# Patient Record
Sex: MALE | Race: WHITE | NOT HISPANIC OR LATINO | Employment: OTHER | URBAN - METROPOLITAN AREA
[De-identification: names, ages, dates, MRNs, and addresses within clinical notes are randomized per-mention and may not be internally consistent; named-entity substitution may affect disease eponyms.]

---

## 2021-07-01 ENCOUNTER — TRANSFERRED RECORDS (OUTPATIENT)
Dept: HEALTH INFORMATION MANAGEMENT | Facility: CLINIC | Age: 71
End: 2021-07-01

## 2021-07-01 ENCOUNTER — MEDICAL CORRESPONDENCE (OUTPATIENT)
Dept: HEALTH INFORMATION MANAGEMENT | Facility: CLINIC | Age: 71
End: 2021-07-01

## 2021-07-01 ENCOUNTER — TRANSCRIBE ORDERS (OUTPATIENT)
Dept: OTHER | Age: 71
End: 2021-07-01

## 2021-07-01 DIAGNOSIS — R47.1 DYSARTHRIA: ICD-10-CM

## 2021-07-01 DIAGNOSIS — R13.10 DYSPHAGIA, UNSPECIFIED: ICD-10-CM

## 2021-07-01 DIAGNOSIS — R25.3 TONGUE FASCICULATION: Primary | ICD-10-CM

## 2021-07-07 ENCOUNTER — OFFICE VISIT (OUTPATIENT)
Dept: NEUROLOGY | Facility: CLINIC | Age: 71
End: 2021-07-07
Payer: MEDICARE

## 2021-07-07 DIAGNOSIS — G12.20 MND (MOTOR NEURONE DISEASE) (H): Primary | ICD-10-CM

## 2021-07-07 PROCEDURE — 95886 MUSC TEST DONE W/N TEST COMP: CPT | Performed by: PSYCHIATRY & NEUROLOGY

## 2021-07-07 PROCEDURE — 95887 MUSC TST DONE W/N TST NONEXT: CPT | Performed by: PSYCHIATRY & NEUROLOGY

## 2021-07-07 PROCEDURE — 95911 NRV CNDJ TEST 9-10 STUDIES: CPT | Performed by: PSYCHIATRY & NEUROLOGY

## 2021-07-07 PROCEDURE — 95885 MUSC TST DONE W/NERV TST LIM: CPT | Mod: 59 | Performed by: PSYCHIATRY & NEUROLOGY

## 2021-07-07 NOTE — LETTER
2021       RE: Murtaza Jean  [address] [place] [address]     Dear Colleague,    Thank you for referring your patient, Murtaza Jean, to the [facility] EMG CLINIC [place] at [facility]. Please see a copy of my visit note below.        [facility]  Electrodiagnostic Laboratory    Nerve Conduction & EMG Report          Patient:       Murtaza Jean  Patient ID:    [id number]  Gender:        Male  YOB: 1950  Age:           70 Years 11 Months        History & Examination:  70 year old man with about 6 months of progressive slurred speech, He denies weakness in his limbs. Evaluate for motor neuron disorder.     Techniques: Motor and sensory conduction studies were done with surface recording electrodes. EMG was done with a concentric needle electrode.      Results:  Nerve conduction studies:  1. Left median-D2, ulnar-D5, radial, sural, and superficial peroneal sensory responses are normal.   2. Left ulnar-ADM motor response shows normal DL, normal amplitude and moderate CV slowing across the elbow.   3. Left median-APB, peroneal-EDB, and tibial-AH motor responses are normal.     Needle EM. Fibrillation potentials and positive sharp waves were seen in the left biceps, PT, FDI, EIP, thoracic paraspinal, TA, gastrocnemius, and tongue muscles.   2. Fasciculation potentials were seen in the left deltoid, biceps, triceps, thoracic paraspinal, TA, VL, gastrocnemius, and tongue muscles.   3. Large amplitude and/or long duration motor unit potentials (MUP) were seen in essentially all of the sampled left upper and lower limb muscles as tabulated below.   4. Rapidly firing MUPs with reduced recruitment were seen in the left deltoid, biceps, triceps, PT, EIP, FDI and TA muscles.     Interpretation:  This is an abnormal study. 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 Clinical correlation is recommended.       Nathan Thomson MD  Department of Neurology        Sensory NCS      Nerve / Sites Rec. Site Onset Peak NP Amp Ref. PP Amp Dist Beau Ref. Temp     ms ms  V  V  V cm m/s m/s  C   L MEDIAN - Dig II Anti      Wrist Dig II 2.60 3.85 31.1 10.0 48.3 14 53.8 48.0 32.1   L ULNAR - Dig V Anti      Wrist Dig V 2.66 3.96 19.4 8.0 33.7 13 48.9 48.0 32.1   L RADIAL - Snuff      Forearm Snuff 2.08 2.76 22.9 15.0 39.7 12.5 60.0 48.0 32.1   L SURAL - Lat Mall 60      Calf Ankle 3.13 4.43 9.9 5.0 9.1 14 44.8 38.0 31   L SUP PERONEAL      Lat Leg Wiggins 3.18 4.48 4.0  4.6 12.5 39.3 38.0 31       Motor NCS      Nerve / Sites Rec. Site Lat Ref. Amp Ref. Rel Amp Dist Beau Ref. Dur. Area Temp.     ms ms mV mV % cm m/s m/s ms %  C   L MEDIAN - APB      Wrist APB 3.96 4.40 11.7 5.0 100 8   8.49 100 32.1      Elbow APB 9.58  10.7  91.9 27 48.0 48.0 8.28 95.9 32.1      Axilla APB 11.67  10.8  92.4 10 48.0  8.49 94.8 32.1   L ULNAR - ADM      Wrist ADM 3.39 3.50 8.4 5.0 100 8   6.56 100 32.8      B.Elbow ADM 8.13  7.6  90.4 26 54.9 48.0 6.82 89.1 32.7      A.Elbow ADM 10.52  7.4  88.2 10 41.7 48.0 6.72 86.3 32.7      Axilla ADM 11.98  7.1  83.7 8 54.9 48.0 6.67 83.5 32.7   L DEEP PERONEAL - EDB 60      Ankle EDB 5.10 6.00 3.0 2.0 100 8   5.00 100 31      FibHead EDB 13.07  2.5  82.5 33 41.4 38.0 6.72 98.6 31      Pop Fos EDB 15.36  2.6  84.6 10 43.6 38.0 7.66 91.1 31   L TIBIAL - AH      Ankle AH 4.48 6.00 6.4 4.0 100 8   5.16 100 31      Pop Fos AH 14.27  5.7  89.2 43 43.9 38.0 5.57 94.2 31       F  Wave      Nerve Min F Lat Max F Lat Mean FLat Temp.    ms ms ms  C   L MEDIAN 29.95 55.63 40.38 32.1   L ULNAR 33.65 39.01 36.76 32   L TIBIAL 50.00 62.34 59.18 31       EMG Summary Table     Spontaneous Recruitment MUAP    IA Fib PSW Fasc H.F. Pattern Amp Dur. PPP   L.  DELTOID N None None 3+ None ModReduced N 1+ 2+   L. BICEPS Inc 1+ 1+ 2+ None MildReduced N N 2+   L. TRICEPS N None None 2+ None MildReduced N 1+ 2+   L. PRON TERES Inc 2+ 2+ None None MildReduced 1+ N 2+   L. FIRST D INTEROSS Inc 3+ 3+ None None ModReduced 2+ N 2+   L. EXT INDICIS Inc 2+ 2+ None None ModReduced 2+ N 1+   L. THOR PSP (M) Inc 2+ 2+ 2+ None       L. VAST LATERALIS N None None 1+ None N N N 1+   L. TIB ANTERIOR Inc 1+ 1+ 2+ None ModReduced N 2+ 2+   L. GASTROCN (MED) Inc 2+ 2+ 1+ None N 1+ N N   L. TONGUE Inc 2+ 2+ 1+ None                                          Again, thank you for allowing me to participate in the care of your patient.      Sincerely,    Nathan Thomson MD

## 2021-07-07 NOTE — PROGRESS NOTES
[facility]  Electrodiagnostic Laboratory    Nerve Conduction & EMG Report          Patient:       Murtaza Jean  Patient ID:    [id number]  Gender:        Male  YOB: 1950  Age:           70 Years 11 Months        History & Examination:  70 year old man with about 6 months of progressive slurred speech, He denies weakness in his limbs. Evaluate for motor neuron disorder.     Techniques: Motor and sensory conduction studies were done with surface recording electrodes. EMG was done with a concentric needle electrode.      Results:  Nerve conduction studies:  1. Left median-D2, ulnar-D5, radial, sural, and superficial peroneal sensory responses are normal.   2. Left ulnar-ADM motor response shows normal DL, normal amplitude and moderate CV slowing across the elbow.   3. Left median-APB, peroneal-EDB, and tibial-AH motor responses are normal.     Needle EM. Fibrillation potentials and positive sharp waves were seen in the left biceps, PT, FDI, EIP, thoracic paraspinal, TA, gastrocnemius, and tongue muscles.   2. Fasciculation potentials were seen in the left deltoid, biceps, triceps, thoracic paraspinal, TA, VL, gastrocnemius, and tongue muscles.   3. Large amplitude and/or long duration motor unit potentials (MUP) were seen in essentially all of the sampled left upper and lower limb muscles as tabulated below.   4. Rapidly firing MUPs with reduced recruitment were seen in the left deltoid, biceps, triceps, PT, EIP, FDI and TA muscles.     Interpretation:  This is an abnormal study. There is electrophysiologic evidence of a generalized disorder of lower motor neurons affecting the bulbar, cervical, thoracic, and lumbosacral segments. 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 Clinical correlation is recommended.       Nathan Thomson MD  Department of Neurology        Sensory NCS      Nerve / Sites Rec. Site Onset Peak NP Amp Ref. PP Amp Dist  Beau Ref. Temp     ms ms  V  V  V cm m/s m/s  C   L MEDIAN - Dig II Anti      Wrist Dig II 2.60 3.85 31.1 10.0 48.3 14 53.8 48.0 32.1   L ULNAR - Dig V Anti      Wrist Dig V 2.66 3.96 19.4 8.0 33.7 13 48.9 48.0 32.1   L RADIAL - Snuff      Forearm Snuff 2.08 2.76 22.9 15.0 39.7 12.5 60.0 48.0 32.1   L SURAL - Lat Mall 60      Calf Ankle 3.13 4.43 9.9 5.0 9.1 14 44.8 38.0 31   L SUP PERONEAL      Lat Leg Wiggins 3.18 4.48 4.0  4.6 12.5 39.3 38.0 31       Motor NCS      Nerve / Sites Rec. Site Lat Ref. Amp Ref. Rel Amp Dist Beau Ref. Dur. Area Temp.     ms ms mV mV % cm m/s m/s ms %  C   L MEDIAN - APB      Wrist APB 3.96 4.40 11.7 5.0 100 8   8.49 100 32.1      Elbow APB 9.58  10.7  91.9 27 48.0 48.0 8.28 95.9 32.1      Axilla APB 11.67  10.8  92.4 10 48.0  8.49 94.8 32.1   L ULNAR - ADM      Wrist ADM 3.39 3.50 8.4 5.0 100 8   6.56 100 32.8      B.Elbow ADM 8.13  7.6  90.4 26 54.9 48.0 6.82 89.1 32.7      A.Elbow ADM 10.52  7.4  88.2 10 41.7 48.0 6.72 86.3 32.7      Axilla ADM 11.98  7.1  83.7 8 54.9 48.0 6.67 83.5 32.7   L DEEP PERONEAL - EDB 60      Ankle EDB 5.10 6.00 3.0 2.0 100 8   5.00 100 31      FibHead EDB 13.07  2.5  82.5 33 41.4 38.0 6.72 98.6 31      Pop Fos EDB 15.36  2.6  84.6 10 43.6 38.0 7.66 91.1 31   L TIBIAL - AH      Ankle AH 4.48 6.00 6.4 4.0 100 8   5.16 100 31      Pop Fos AH 14.27  5.7  89.2 43 43.9 38.0 5.57 94.2 31       F  Wave      Nerve Min F Lat Max F Lat Mean FLat Temp.    ms ms ms  C   L MEDIAN 29.95 55.63 40.38 32.1   L ULNAR 33.65 39.01 36.76 32   L TIBIAL 50.00 62.34 59.18 31       EMG Summary Table     Spontaneous Recruitment MUAP    IA Fib PSW Fasc H.F. Pattern Amp Dur. PPP   L. DELTOID N None None 3+ None ModReduced N 1+ 2+   L. BICEPS Inc 1+ 1+ 2+ None MildReduced N N 2+   L. TRICEPS N None None 2+ None MildReduced N 1+ 2+   L. PRON TERES Inc 2+ 2+ None None MildReduced 1+ N 2+   L. FIRST D INTEROSS Inc 3+ 3+ None None ModReduced 2+ N 2+   L. EXT INDICIS Inc 2+ 2+ None None  ModReduced 2+ N 1+   L. THOR PSP (M) Inc 2+ 2+ 2+ None       L. VAST LATERALIS N None None 1+ None N N N 1+   L. TIB ANTERIOR Inc 1+ 1+ 2+ None ModReduced N 2+ 2+   L. GASTROCN (MED) Inc 2+ 2+ 1+ None N 1+ N N   L. TONGUE Inc 2+ 2+ 1+ None

## 2021-07-08 ENCOUNTER — TRANSFERRED RECORDS (OUTPATIENT)
Dept: HEALTH INFORMATION MANAGEMENT | Facility: CLINIC | Age: 71
End: 2021-07-08

## 2021-07-09 ENCOUNTER — MEDICAL CORRESPONDENCE (OUTPATIENT)
Dept: HEALTH INFORMATION MANAGEMENT | Facility: CLINIC | Age: 71
End: 2021-07-09

## 2021-07-12 ENCOUNTER — TRANSCRIBE ORDERS (OUTPATIENT)
Dept: OTHER | Age: 71
End: 2021-07-12

## 2021-07-12 DIAGNOSIS — G12.20 MOTOR NEURON DISEASE (H): Primary | ICD-10-CM

## 2021-07-19 ENCOUNTER — TELEPHONE (OUTPATIENT)
Dept: NEUROLOGY | Facility: CLINIC | Age: 71
End: 2021-07-19

## 2021-07-19 NOTE — TELEPHONE ENCOUNTER
[facility] Call Center    Phone Message    May a detailed message be left on voicemail: yes     Reason for Call: Other: Please call patient at [phone] to explain what are the next steps for the patient and who he is going to be scheduling with next.     Action Taken: Message routed to:  [facility] ([facility] Neurology    Travel Screening: Not Applicable

## 2021-07-22 ASSESSMENT — ENCOUNTER SYMPTOMS
MEMORY LOSS: 0
SWOLLEN GLANDS: 0
HEARTBURN: 0
SYNCOPE: 0
STIFFNESS: 1
SPUTUM PRODUCTION: 1
WEAKNESS: 1
COUGH DISTURBING SLEEP: 1
SEIZURES: 0
BLOOD IN STOOL: 0
TROUBLE SWALLOWING: 1
POSTURAL DYSPNEA: 1
SLEEP DISTURBANCES DUE TO BREATHING: 0
MYALGIAS: 1
NECK MASS: 0
HYPERTENSION: 1
TINGLING: 1
NAUSEA: 0
SHORTNESS OF BREATH: 1
WHEEZING: 0
POOR WOUND HEALING: 0
DYSPNEA ON EXERTION: 1
SNORES LOUDLY: 1
PANIC: 0
INSOMNIA: 1
HYPOTENSION: 0
SINUS CONGESTION: 1
MUSCLE CRAMPS: 1
DISTURBANCES IN COORDINATION: 0
SMELL DISTURBANCE: 0
NECK PAIN: 0
SPEECH CHANGE: 1
ORTHOPNEA: 1
MUSCLE WEAKNESS: 1
NUMBNESS: 1
EXERCISE INTOLERANCE: 0
BACK PAIN: 1
BLOATING: 0
DIZZINESS: 0
PALPITATIONS: 1
HEADACHES: 1
LOSS OF CONSCIOUSNESS: 0
SORE THROAT: 1
BRUISES/BLEEDS EASILY: 1
SKIN CHANGES: 1
HOARSE VOICE: 1
DECREASED CONCENTRATION: 0
BOWEL INCONTINENCE: 0
DIARRHEA: 0
CONSTIPATION: 1
JOINT SWELLING: 0
NAIL CHANGES: 1
TREMORS: 1
ABDOMINAL PAIN: 0
ARTHRALGIAS: 1
TASTE DISTURBANCE: 0
COUGH: 1
VOMITING: 0
LIGHT-HEADEDNESS: 0
SINUS PAIN: 1
JAUNDICE: 0
NERVOUS/ANXIOUS: 0
RECTAL PAIN: 0
PARALYSIS: 0
DEPRESSION: 0
HEMOPTYSIS: 0
LEG PAIN: 1

## 2021-08-02 DIAGNOSIS — G12.21 ALS (AMYOTROPHIC LATERAL SCLEROSIS) (H): Primary | ICD-10-CM

## 2021-08-04 ENCOUNTER — OFFICE VISIT (OUTPATIENT)
Dept: NEUROLOGY | Facility: CLINIC | Age: 71
End: 2021-08-04
Payer: MEDICARE

## 2021-08-04 ENCOUNTER — LAB (OUTPATIENT)
Dept: LAB | Facility: CLINIC | Age: 71
End: 2021-08-04
Payer: MEDICARE

## 2021-08-04 VITALS
DIASTOLIC BLOOD PRESSURE: 89 MMHG | WEIGHT: 216 LBS | SYSTOLIC BLOOD PRESSURE: 130 MMHG | OXYGEN SATURATION: 95 % | HEART RATE: 73 BPM | RESPIRATION RATE: 16 BRPM

## 2021-08-04 DIAGNOSIS — G12.21 ALS (AMYOTROPHIC LATERAL SCLEROSIS) (H): Primary | ICD-10-CM

## 2021-08-04 DIAGNOSIS — G12.21 ALS (AMYOTROPHIC LATERAL SCLEROSIS) (H): ICD-10-CM

## 2021-08-04 LAB
ALBUMIN SERPL-MCNC: 4.1 G/DL (ref 3.4–5)
ALP SERPL-CCNC: 67 U/L (ref 40–150)
ALT SERPL W P-5'-P-CCNC: 44 U/L (ref 0–70)
ANION GAP SERPL CALCULATED.3IONS-SCNC: 2 MMOL/L (ref 3–14)
AST SERPL W P-5'-P-CCNC: 25 U/L (ref 0–45)
B BURGDOR IGG+IGM SER QL: 0.02
BASOPHILS # BLD AUTO: 0.2 10E3/UL (ref 0–0.2)
BASOPHILS NFR BLD AUTO: 2 %
BILIRUB SERPL-MCNC: 0.9 MG/DL (ref 0.2–1.3)
BUN SERPL-MCNC: 21 MG/DL (ref 7–30)
CALCIUM SERPL-MCNC: 9.7 MG/DL (ref 8.5–10.1)
CHLORIDE BLD-SCNC: 107 MMOL/L (ref 94–109)
CO2 SERPL-SCNC: 33 MMOL/L (ref 20–32)
CREAT SERPL-MCNC: 1.37 MG/DL (ref 0.66–1.25)
EOSINOPHIL # BLD AUTO: 0.1 10E3/UL (ref 0–0.7)
EOSINOPHIL NFR BLD AUTO: 1 %
ERYTHROCYTE [DISTWIDTH] IN BLOOD BY AUTOMATED COUNT: 16.8 % (ref 10–15)
ERYTHROCYTE [SEDIMENTATION RATE] IN BLOOD BY WESTERGREN METHOD: 1 MM/HR (ref 0–20)
EXPTIME-PRE: 8.51 SEC
FEF2575-%PRED-PRE: 77 %
FEF2575-PRE: 1.95 L/SEC
FEF2575-PRED: 2.52 L/SEC
FEFMAX-%PRED-PRE: 66 %
FEFMAX-PRE: 5.75 L/SEC
FEFMAX-PRED: 8.72 L/SEC
FEV1-%PRED-PRE: 66 %
FEV1-PRE: 2.25 L
FEV1FEV6-PRE: 77 %
FEV1FEV6-PRED: 78 %
FEV1FVC-PRE: 77 %
FEV1FVC-PRED: 75 %
FIFMAX-PRE: 2.94 L/SEC
FVC-%PRED-PRE: 64 %
FVC-PRE: 2.91 L
FVC-PRED: 4.51 L
GFR SERPL CREATININE-BSD FRML MDRD: 52 ML/MIN/1.73M2
GLUCOSE BLD-MCNC: 91 MG/DL (ref 70–99)
HCT VFR BLD AUTO: 58.9 % (ref 40–53)
HGB BLD-MCNC: 18.7 G/DL (ref 13.3–17.7)
IMM GRANULOCYTES # BLD: 0.1 10E3/UL
IMM GRANULOCYTES NFR BLD: 1 %
LYMPHOCYTES # BLD AUTO: 1 10E3/UL (ref 0.8–5.3)
LYMPHOCYTES NFR BLD AUTO: 11 %
MCH RBC QN AUTO: 27.7 PG (ref 26.5–33)
MCHC RBC AUTO-ENTMCNC: 31.7 G/DL (ref 31.5–36.5)
MCV RBC AUTO: 87 FL (ref 78–100)
MEP-PRE: 50 CMH2O
MIP-PRE: -33 CMH2O
MONOCYTES # BLD AUTO: 0.5 10E3/UL (ref 0–1.3)
MONOCYTES NFR BLD AUTO: 5 %
NEUTROPHILS # BLD AUTO: 7.3 10E3/UL (ref 1.6–8.3)
NEUTROPHILS NFR BLD AUTO: 80 %
NRBC # BLD AUTO: 0 10E3/UL
NRBC BLD AUTO-RTO: 0 /100
PLATELET # BLD AUTO: 491 10E3/UL (ref 150–450)
POTASSIUM BLD-SCNC: 5 MMOL/L (ref 3.4–5.3)
PROT SERPL-MCNC: 7.8 G/DL (ref 6.8–8.8)
RBC # BLD AUTO: 6.74 10E6/UL (ref 4.4–5.9)
SODIUM SERPL-SCNC: 142 MMOL/L (ref 133–144)
WBC # BLD AUTO: 9.1 10E3/UL (ref 4–11)

## 2021-08-04 PROCEDURE — 85652 RBC SED RATE AUTOMATED: CPT | Performed by: PATHOLOGY

## 2021-08-04 PROCEDURE — 99205 OFFICE O/P NEW HI 60 MIN: CPT | Performed by: PSYCHIATRY & NEUROLOGY

## 2021-08-04 PROCEDURE — 86618 LYME DISEASE ANTIBODY: CPT | Performed by: PSYCHIATRY & NEUROLOGY

## 2021-08-04 PROCEDURE — 94375 RESPIRATORY FLOW VOLUME LOOP: CPT | Performed by: INTERNAL MEDICINE

## 2021-08-04 PROCEDURE — 36415 COLL VENOUS BLD VENIPUNCTURE: CPT | Performed by: PATHOLOGY

## 2021-08-04 PROCEDURE — 85025 COMPLETE CBC W/AUTO DIFF WBC: CPT | Performed by: PATHOLOGY

## 2021-08-04 PROCEDURE — 99417 PROLNG OP E/M EACH 15 MIN: CPT | Performed by: PSYCHIATRY & NEUROLOGY

## 2021-08-04 PROCEDURE — 80053 COMPREHEN METABOLIC PANEL: CPT | Performed by: PATHOLOGY

## 2021-08-04 RX ORDER — LOSARTAN POTASSIUM 50 MG/1
50 TABLET ORAL
COMMUNITY
Start: 2021-03-10

## 2021-08-04 RX ORDER — MULTIPLE VITAMINS W/ MINERALS TAB 9MG-400MCG
1 TAB ORAL
COMMUNITY

## 2021-08-04 RX ORDER — TRIAMCINOLONE ACETONIDE 1 MG/G
CREAM TOPICAL
COMMUNITY
Start: 2021-03-10

## 2021-08-04 RX ORDER — SIMVASTATIN 40 MG
40 TABLET ORAL
COMMUNITY
Start: 2021-03-10

## 2021-08-04 RX ORDER — METOPROLOL SUCCINATE 25 MG/1
25 TABLET, EXTENDED RELEASE ORAL
COMMUNITY
Start: 2021-04-08 | End: 2022-01-01

## 2021-08-04 RX ORDER — PSYLLIUM HUSK (WITH SUGAR) 3 G/12 G
POWDER (GRAM) ORAL
COMMUNITY

## 2021-08-04 ASSESSMENT — REVISED AMYOTROPHIC LATERAL SCLEROSIS FUNCTIONAL RATING SCALE (ALSFRS-R)
HANDWRITING: 4
DRESSING_AND_HYGEINE: NORMAL FUNCTION
BULBAR_SUBTOTAL: 5
FINE_MOTOR_SUBTOTAL_SCORE: 12
DYSPNEA: 4
TURNING_IN_BED_AND_ADJUSTING_BED_CLOTHES: SOMEWHAT SLOW AND CLUMSY, BUT NO HELP NEEDED
WALKING: 3
SPEECH: 2
SWALLOWING: 2
CUTTING_FOOD_AND_HANDLING_UTENSILES: 4
TURNING_IN_BED_AND_ADJUSTING_BED_CLOTHES: 3
HANDWRITING: NORMAL
CLIMBING_STAIRS: MILD UNSTEADINESS OR FATIGUE
WALKING: EARLY AMBULATION DIFFICULTIES
ALSFRS_TOTAL_SCORE: 36
RESPIRATORY_INSUFFICIENCY: 4
RESPIRATORY_SUBTOTAL_SCORE: 11
SALIVATION: MARKED EXCESS OF SALIVA WITH SOME DROOLING
SWALLOWING: DIETARY CONSISTENCY CHANGES
GROSS_MOTOR_SUBTOTAL_SCORE: 8
ORTHOPENA: SOME DIFFICULTY SLEEPING AT NIGHT DUE TO SHORTNESS OF BREATH, DOES NOT ROUTINELY USE MORE THAN TWO PILLOWS
SPEECH: INTELLIGIBLE WITH REPEATING
ORTHOPENA: 3
CLIMBING_STAIRS: 2
DRESSING_AND_HYGEINE: 4
SIX_ITEM_SUBTOTAL: 20
SALIVATION: 1

## 2021-08-04 ASSESSMENT — PAIN SCALES - GENERAL: PAINLEVEL: NO PAIN (0)

## 2021-08-04 NOTE — NURSING NOTE
Chief Complaint   Patient presents with     Consult     [facility] NEW ALS/MOTOR NEURON      Steve Penny

## 2021-08-04 NOTE — Clinical Note
Ester - many things going on here. Please arrange a meet the team follow up (they DO want to come here for that). Can do next hepatic panel at that time. FAX CBC to PCP and notify them that the patient needs follow up of abnormal CBC with them. Patient needs NIV and cough assist. Patient wants Radicava; I think [facility] would [facility]. Please let him know I have also ordered a medication to reduce saliva. I ordered a cervical MRI but he would like to do it at [facility]. Thanks.

## 2021-08-04 NOTE — LETTER
2021       RE: Murtaza Jean  [address]  [place] [address]     Dear Colleague,    Thank you for referring your patient, Murtaza Jean, to the [facility] NEUROLOGY CLINIC [place] at [facility]. Please see a copy of my visit note below.    Service Date: 2021    Tala King MD  [facility]  [address] [place]  [address]    Braxton Pelaez MD  [facility]  [address]  [place]  [address]    RE:     Murtaza Jean  MRN:  [id number]  :  1950    Dear Doctors:    I met with Murtaza Jean and his significant other today for further evaluation of a diagnosis of suspected ALS.  Mr. Jean brought an excellent summary of his symptoms that led to this evaluation.  At the beginning of this year, he noted a change in his speech as well as sporadic difficulty swallowing.  Both have progressed.  He also developed some difficulty breathing and was ultimately diagnosed as having atrial fibrillation.  In  of this year, he was referred for an ENT evaluation, which identified neurological findings and was ultimately referred to Dr. King for further neurologic evaluation.  An electrodiagnostic study demonstrated evidence of widespread fibrillation potentials, fasciculation potentials and motor unit remodeling and is documented in our electronic health record.  A brain MRI demonstrated no diagnostic findings.  He was given a presumptive diagnosis of ALS and referred for further evaluation.    Mr. Jean has had only modest weight loss.  Dysphagia is for both liquids and solids.  He denies positive or negative sensory symptoms.  He has had some cramping with voluntary muscle contraction and prominent fasciculations in the upper limbs.  He has not noted facial fasciculations.  He has noted some muscle weakness, which is not interfering with daily activity.  He has not had any falls but has noted that his walking  is not as strong as previously.  He has also noted excess saliva.  In the last month, he has had some difficulty sleeping, morning headaches and nonrestorative sleep.  He denies facial numbness.  He denies changes in hearing.  He denies consistent headache except as noted above, diplopia, hearing loss or fever.    PAST MEDICAL HISTORY:  Unremarkable.  Of note, he also denies tremor or cognitive symptoms.  He has been treated for hyperlipidemia in addition to the aforementioned management of atrial fibrillation.    FAMILY HISTORY:  The patient has 1 healthy son.  He has a brother who passed away of cancer.  He has 2 other siblings, 1 male and 1 female, without neurological problems.  His father developed dementia in his mid 80s.  His mother  of cancer at age 61.  His mother had 8 siblings and his father 10.  There is no family history of neuromuscular or neurodegenerative disease.  His maternal grandfather  in his 40s, cause unknown.    The patient is a  .  He is a retired infantry member and a retired .    PHYSICAL EXAMINATION:  The patient is a healthy appearing gentleman.  Vital signs are normal.  Examination of skin and extremities is unremarkable.  Neck is supple.    NEUROLOGIC EXAMINATION:  The patient has a moderately severe, principally flaccid dysarthria which is 90% comprehensible. There is no language dysfunction.  Affect is normal.  Pupils are equal, round and reactive to light.  There is no ptosis.  Orbicularis oculi strength is normal.  Extraocular movements are full without pathologic nystagmus.  There is moderately severe orbicularis oris weakness.  Tongue is thin and fasciculates at rest.  There is moderately severe weakness of the tongue.  Tongue movements are mildly slowed.  Labial lingual and guttural sounds are mildly slowed as well.  Pterygoid strength is normal.  Jaw jerk is not present.  Perception of touch and pin is preserved.  Vibration scores are 7 at  the right great toe, 0 at the left great toe, and 7 at the left malleolus.  Motor examination demonstrates moderate atrophy of FDI bilaterally with otherwise preserved bulk.  Tone is equivocally increased in the right upper limb and normal elsewhere.  Rapid alternating and rapid repetitive movements are within broad normal limits in the upper limbs and normal in the lower limbs.  Manual muscle testing demonstrates full strength except for equivocal weakness of left finger extensors and mild weakness of right first dorsal interosseous.  Reflexes are 2+ throughout except at the left brachioradialis where his score is 3+.  There is spread to finger flexors from biceps bilaterally.  Plantar responses are downgoing on the right and equivocal on the left.  Rosalba signs are absent bilaterally.  Neck flexion and extension strength are normal.  He does report difficulty sitting from a supine position, although he is able to do so independently.  Abdominal reflexes are absent.    MEDICAL RECORD REVIEW:  Electrodiagnostic studies are as noted.  Brain MRI was personally reviewed and is unremarkable.  A recent CBC was a mildly abnormal.  Basic metabolic panel demonstrates mild renal dysfunction, which has been present for a long period of time reportedly.    I explained the following to Mr. Jean and his friend over the course of a 90-minute visit:  I concur with the diagnosis of ALS.  We discussed the diagnostic process in some detail.  There is a paucity of upper motor neuron signs and this mandates a careful look for features of motor neuropathy or polyradiculopathy.  That said the prominent onset with bulbar findings exclude the vast majority of conditions in this category.  An infiltrative process including the subarachnoid space can cause widespread lower motor neuron findings including the cranial region; however, the preservation of reflexes, absence of sensory findings, absence of cranial nerve findings, and  absence of features of a systemic infiltrative process make this exceedingly unlikely.  We will extend his laboratory studies modestly. I will obtain a cervical MRI and, should there be a structural explanation for his equivocal upper motor neuron signs, one could argue that spinal fluid evaluation could merit consideration.  Finally, we discussed alternative consideration of spinal bulbar muscular atrophy.  We will arrange genetic testing for this.  The absence of facial fasciculations, sensory neuropathy, or tremor as well as the age of onset all make this somewhat less likely and this should always be considered in a male presenting with a flaccid dysarthria.    We then discussed the natural history, prognosis, and management of his condition at length.  He will start on riluzole and Radicava.  I will repeat a CBC first in addition to obtaining a comprehensive metabolic panel. Further hematologic evaluation may be indicated  should his hemoglobin and differential remain meaningfully abnormal.  He will have pulmonary function studies today.  I think it likely that he has moderate ventilatory dysfunction and would benefit from noninvasive ventilation.  We did not discuss his sialorrhea in detail, but he may benefit from modest use of an anticholinergic, watching closely for mucus plugging.  It will be important for him to meet with a speech language pathologist to discuss both swallowing precautions as well as augmentative communication.  If this cannot be done soon in our clinic, it could be done locally.  We discussed the potential for gastrostomy and we discussed management of motor neuron disease at the end of life.  He is considering personal decisions that would be included in an Advance Directive.  Finally, we discussed the fact that this is a [facility]-supported diagnosis.  He will move forward with that process as well, which obviously could be modified in the highly unlikely possibility of the aforementioned  investigations identifying an alternative diagnosis.    Sincerely,    Seven Horn MD      Addendum 5 August 2021: Called patient to review test results, recommendations going forward, and to answer any questions they may have.     - HGB and platelet count remain mildly elevated. Will forward to PCP and patient should discuss with him.  - Hepatic panel normal. Will [facility] per patient request.  - Will arrange follow up here to meet with [facility] clinic team.  - [facility] to begin [facility] of arrange service connection.  - Patient will benefit from NIV and cough assist.    Patient will also benefit from glycopyrrolate for sialorrea.      90 minutes spent on the date of the encounter on chart review, history and examination, documentation and further activities as noted above.      Again, thank you for allowing me to participate in the care of your patient.      Sincerely,    Seven Horn MD

## 2021-08-04 NOTE — PROGRESS NOTES
Service Date: 2021    Tala King MD  [facility]  [address] [place]  [address]    Braxton Pelaez MD  [facility]  [address] [place]  [address]    RE:     Murtaza Jean  MRN:  [id number]  :  1950    Dear Doctors:    I met with Murtaza Jean and his significant other today for further evaluation of a diagnosis of suspected ALS.  Mr. Jean brought an excellent summary of his symptoms that led to this evaluation.  At the beginning of this year, he noted a change in his speech as well as sporadic difficulty swallowing.  Both have progressed.  He also developed some difficulty breathing and was ultimately diagnosed as having atrial fibrillation.  In  of this year, he was referred for an ENT evaluation, which identified neurological findings and was ultimately referred to Dr. King for further neurologic evaluation.  An electrodiagnostic study demonstrated evidence of widespread fibrillation potentials, fasciculation potentials and motor unit remodeling and is documented in our electronic health record.  A brain MRI demonstrated no diagnostic findings.  He was given a presumptive diagnosis of ALS and referred for further evaluation.    Mr. Jean has had only modest weight loss.  Dysphagia is for both liquids and solids.  He denies positive or negative sensory symptoms.  He has had some cramping with voluntary muscle contraction and prominent fasciculations in the upper limbs.  He has not noted facial fasciculations.  He has noted some muscle weakness, which is not interfering with daily activity.  He has not had any falls but has noted that his walking is not as strong as previously.  He has also noted excess saliva.  In the last month, he has had some difficulty sleeping, morning headaches and nonrestorative sleep.  He denies facial numbness.  He denies changes in hearing.  He denies consistent headache except as noted above, diplopia, hearing loss or  fever.    PAST MEDICAL HISTORY:  Unremarkable.  Of note, he also denies tremor or cognitive symptoms.  He has been treated for hyperlipidemia in addition to the aforementioned management of atrial fibrillation.    FAMILY HISTORY:  The patient has 1 healthy son.  He has a brother who passed away of cancer.  He has 2 other siblings, 1 male and 1 female, without neurological problems.  His father developed dementia in his mid 80s.  His mother  of cancer at age 61.  His mother had 8 siblings and his father 10.  There is no family history of neuromuscular or neurodegenerative disease.  His maternal grandfather  in his 40s, cause unknown.    The patient is a  .  He is a retired infantry member and a retired .    PHYSICAL EXAMINATION:  The patient is a healthy appearing gentleman.  Vital signs are normal.  Examination of skin and extremities is unremarkable.  Neck is supple.    NEUROLOGIC EXAMINATION:  The patient has a moderately severe, principally flaccid dysarthria which is 90% comprehensible. There is no language dysfunction.  Affect is normal.  Pupils are equal, round and reactive to light.  There is no ptosis.  Orbicularis oculi strength is normal.  Extraocular movements are full without pathologic nystagmus.  There is moderately severe orbicularis oris weakness.  Tongue is thin and fasciculates at rest.  There is moderately severe weakness of the tongue.  Tongue movements are mildly slowed.  Labial lingual and guttural sounds are mildly slowed as well.  Pterygoid strength is normal.  Jaw jerk is not present.  Perception of touch and pin is preserved.  Vibration scores are 7 at the right great toe, 0 at the left great toe, and 7 at the left malleolus.  Motor examination demonstrates moderate atrophy of FDI bilaterally with otherwise preserved bulk.  Tone is equivocally increased in the right upper limb and normal elsewhere.  Rapid alternating and rapid repetitive movements are  within broad normal limits in the upper limbs and normal in the lower limbs.  Manual muscle testing demonstrates full strength except for equivocal weakness of left finger extensors and mild weakness of right first dorsal interosseous.  Reflexes are 2+ throughout except at the left brachioradialis where his score is 3+.  There is spread to finger flexors from biceps bilaterally.  Plantar responses are downgoing on the right and equivocal on the left.  Rosalba signs are absent bilaterally.  Neck flexion and extension strength are normal.  He does report difficulty sitting from a supine position, although he is able to do so independently.  Abdominal reflexes are absent.    MEDICAL RECORD REVIEW:  Electrodiagnostic studies are as noted.  Brain MRI was personally reviewed and is unremarkable.  A recent CBC was a mildly abnormal.  Basic metabolic panel demonstrates mild renal dysfunction, which has been present for a long period of time reportedly.    I explained the following to Mr. Jean and his friend over the course of a 90-minute visit:  I concur with the diagnosis of ALS.  We discussed the diagnostic process in some detail.  There is a paucity of upper motor neuron signs and this mandates a careful look for features of motor neuropathy or polyradiculopathy.  That said the prominent onset with bulbar findings exclude the vast majority of conditions in this category.  An infiltrative process including the subarachnoid space can cause widespread lower motor neuron findings including the cranial region; however, the preservation of reflexes, absence of sensory findings, absence of cranial nerve findings, and absence of features of a systemic infiltrative process make this exceedingly unlikely.  We will extend his laboratory studies modestly. I will obtain a cervical MRI and, should there be a structural explanation for his equivocal upper motor neuron signs, one could argue that spinal fluid evaluation could merit  consideration.  Finally, we discussed alternative consideration of spinal bulbar muscular atrophy.  We will arrange genetic testing for this.  The absence of facial fasciculations, sensory neuropathy, or tremor as well as the age of onset all make this somewhat less likely and this should always be considered in a male presenting with a flaccid dysarthria.    We then discussed the natural history, prognosis, and management of his condition at length.  He will start on riluzole and Radicava.  I will repeat a CBC first in addition to obtaining a comprehensive metabolic panel. Further hematologic evaluation may be indicated  should his hemoglobin and differential remain meaningfully abnormal.  He will have pulmonary function studies today.  I think it likely that he has moderate ventilatory dysfunction and would benefit from noninvasive ventilation.  We did not discuss his sialorrhea in detail, but he may benefit from modest use of an anticholinergic, watching closely for mucus plugging.  It will be important for him to meet with a speech language pathologist to discuss both swallowing precautions as well as augmentative communication.  If this cannot be done soon in our clinic, it could be done locally.  We discussed the potential for gastrostomy and we discussed management of motor neuron disease at the end of life.  He is considering personal decisions that would be included in an Advance Directive.  Finally, we discussed the fact that this is a [facility]-supported diagnosis.  He will move forward with that process as well, which obviously could be modified in the highly unlikely possibility of the aforementioned investigations identifying an alternative diagnosis.    Sincerely,    Seven Horn MD        D: 2021   T: 2021   MT: chelle    Name:     JAVON TRANYris  MRN:      [id number]        Account:      [id number]   :      1950           Service Date: 2021       Document: [id number]    Addendum  5 August 2021: Called patient to review test results, recommendations going forward, and to answer any questions they may have.     - HGB and platelet count remain mildly elevated. Will forward to PCP and patient should discuss with him.  - Hepatic panel normal. Will [facility] per [facility] request.  - Will arrange follow up here to meet with [facility] clinic team.  - Patient is in contact with [facility] to begin process of arrange service connection.  - Patient will benefit from NIV and cough assist.    Patient will also benefit from glycopyrrolate for sialorrea.      90 minutes spent on the date of the encounter on chart review, history and examination, documentation and further activities as noted above.

## 2021-08-04 NOTE — PATIENT INSTRUCTIONS
I agree with the diagnosis of ALS. We will do some further testing as well.   If your blood tests are normal we will start Riluzole and Radicava.  I have also ordered a cervical MRI. This can be done at any  Health facility. If you would like to do it in [facility], let us know and we will send the order.  Breathing test today. Depending upon results, NIV (BiPAP) breathing device at night may be indicated.  Begin the process of enrolling with the [facility]. [facility] can give you information about the PVA as they can help with this if needed.  I will forward your information to our ALS Association representative as well.     Follow up in our clinic in several weeks. If there is any significant delay, speech therapy appointment locally.    Please call [facility] @ [phone] for questions or concerns during regular business hours. For a more efficient way to communicate, use FameBit and address the message to your physician. Remember, Supreme[employer] is only read during business hours. Do not leave urgent messages on voicemail or Supremext. If situation is urgent, contact the Neurology Clinic @ [phone] and ask to speak to a Triage Nurse or Call 911 or visit an Emergency Department.     Please call your pharmacy if you need a medication refill. They will send us an electronic message.

## 2021-08-05 ENCOUNTER — TELEPHONE (OUTPATIENT)
Dept: NEUROLOGY | Facility: CLINIC | Age: 71
End: 2021-08-05

## 2021-08-05 DIAGNOSIS — G12.21 ALS (AMYOTROPHIC LATERAL SCLEROSIS) (H): Primary | ICD-10-CM

## 2021-08-05 RX ORDER — GLYCOPYRROLATE 1 MG/1
TABLET ORAL
Qty: 90 TABLET | Refills: 3 | Status: SHIPPED | OUTPATIENT
Start: 2021-08-05

## 2021-08-05 RX ORDER — RILUZOLE 50 MG/1
50 TABLET, FILM COATED ORAL EVERY 12 HOURS
Qty: 60 TABLET | Refills: 3 | Status: SHIPPED | OUTPATIENT
Start: 2021-08-05

## 2021-08-10 ENCOUNTER — TRANSFERRED RECORDS (OUTPATIENT)
Dept: HEALTH INFORMATION MANAGEMENT | Facility: CLINIC | Age: 71
End: 2021-08-10

## 2021-08-12 ENCOUNTER — LAB (OUTPATIENT)
Dept: LAB | Facility: CLINIC | Age: 71
End: 2021-08-12
Payer: MEDICARE

## 2021-08-12 ENCOUNTER — TELEPHONE (OUTPATIENT)
Dept: NEUROLOGY | Facility: CLINIC | Age: 71
End: 2021-08-12

## 2021-08-12 ENCOUNTER — TRANSFERRED RECORDS (OUTPATIENT)
Dept: HEALTH INFORMATION MANAGEMENT | Facility: CLINIC | Age: 71
End: 2021-08-12

## 2021-08-12 ENCOUNTER — OFFICE VISIT (OUTPATIENT)
Dept: NEUROLOGY | Facility: CLINIC | Age: 71
End: 2021-08-12
Payer: MEDICARE

## 2021-08-12 VITALS
OXYGEN SATURATION: 96 % | HEART RATE: 65 BPM | SYSTOLIC BLOOD PRESSURE: 134 MMHG | DIASTOLIC BLOOD PRESSURE: 85 MMHG | RESPIRATION RATE: 16 BRPM

## 2021-08-12 DIAGNOSIS — G12.21 ALS (AMYOTROPHIC LATERAL SCLEROSIS) (H): ICD-10-CM

## 2021-08-12 PROCEDURE — 36415 COLL VENOUS BLD VENIPUNCTURE: CPT | Performed by: PATHOLOGY

## 2021-08-12 PROCEDURE — 96040 PR GENETIC COUNSELING, EACH 30 MIN: CPT | Performed by: GENETIC COUNSELOR, MS

## 2021-08-12 PROCEDURE — 99207 PR NO CHARGE LOS: CPT | Performed by: GENETIC COUNSELOR, MS

## 2021-08-12 NOTE — NURSING NOTE
Chief Complaint   Patient presents with     Genetic Counseling     [facility] Genetic [facility]     Lai Estrada

## 2021-08-19 NOTE — PROGRESS NOTES
Presenting information: Murtaza is a 71 year old male with a history of ALS. He was evaluated at the [facility] today. Murtaza was accompanied by his wife Britt. I met with the family at the request of Dr. Horn to obtain a personal and family history, discuss possible genetic contributions to his symptoms, and to obtain informed consent for genetic testing.     Personal History: Murtaza noticed a change in his speech as well as sporadic difficulty swallowing earlier this year. Both have progressed. He also developed some difficulty breathing and was ultimately diagnosed as having atrial fibrillation. He has had some cramping with voluntary muscle contraction and prominent fasciculations in the upper limbs. He has noted some muscle weakness, which is not interfering with daily activity.  See Dr. Horn s note for additional details.     Family History: A three generation pedigree was obtained today and scanned into the EMR. This family history is by patient report only and has not been verified with medical records except where noted. The following information is significant:     Murtaza has a son (age 33) who is healthy.    Murtaza had two brothers and one sister. His brother  at age 68 from prostate cancer and had positive genetic testing for BRCA. He had one son and two daughters who are healthy. uMrtaza s brother (age 64) is healthy and has two daughters and one son who are healthy. Murtaza s sister (age 61) is healthy and has one son and one daughter who are healthy.    Murtaza dugan mother  at 61 from ovarian cancer. She had eight siblings who had no neurological symptoms. Murtaza dugan maternal grandfather  in his 40s of unknown causes.    Murtaza dugan father  at 91. He had dementia in his 80s and stents in his heart. Murtaza dugan father had ten siblings who did not have neurological symptoms.     The family history is otherwise negative for dementia, Parkinson s disease, movement disorders,  ALS, muscle weakness, and other neurological symptoms. Consanguinity was denied. Paternal ancestry is [place]. Maternal ancestry is [place] and [place].  See scanned pedigree under the media tab.    Discussion: We reviewed clinical features of ALS and Vickey s disease, as well as the genetics and inheritance of these conditions, and genetic testing.     Vickey s Disease  Vickey s disease is a progressive neuromuscular disorder caused by the degradation of lower motor neurons. The main features of Vickey s disease include muscle weakness, cramps, tremors, difficulty speaking, and difficulty swallowing. These features are due to the muscles of the mouth and throat as well as proximal muscles (muscles closer to the center of the body) being affected. The age of onset of these symptoms is typically between age 30-60, while progression of the disease can occur slowly over the course of multiple decades. Other features of Vickey s disease can include androgen insensitivity during adolescence which can cause development of breast tissue (gynecomastia), shrinking of testicles, and infertility in affected males. The lifespan of individuals with Vickey s disease is not typically affected, but they can have reduced mobility later in disease progression.     Genetics of Vickey s Disease  Genes are pieces of information we inherit from our parents that provide instructions for our body s many functions. These genes are organized onto structures called chromosomes in our cells. We typically have 23 pairs of chromosomes, with a set of 23 inherited from each parent. The 23rd pair of chromosomes are called sex chromosomes. Males typically have XY sex chromosomes and females typically have XX sex chromosomes.    Vickey s disease is caused due to a change in the AR gene which is located on the X chromosome. Since males have only one copy of the X chromosome, one changed copy of the AR gene is sufficient to cause disease. In  females, both copies of the X chromosome must contain a changed AR gene in order for them to be affected. Males cannot pass on the changed gene to their sons, but all their daughters inherited the gene and are called carriers of the condition. Female carriers have a 50% chance of having a son with Vickey s disease and a 50% chance of having a daughter who is also a carrier.     Vickey s disease is caused due to a trinucleotide repeat expansion in the AR gene. This means there is a region in the gene of 3 letters (CAG) that is repeated multiple times. 34 of less repeats is considered a negative or normal result. 35-37 CAG repeats is considered positive with reduced penetrance and greater than 38 repeats is considered positive with full penetrance. Reduced penetrance is when some individuals with repeats in that range do not develop features of Vickey s disease while full penetrance is when all individuals with repeats above 38 develop Vickey s disease. A greater number of repeats is correlated with earlier onset of symptoms and a more rapid progression of the disease.    ALS  Amyotrophic lateral sclerosis (ALS) affects motor neurons, which are responsible for sending signals to our muscles that allow movement and speech. When an individual has ALS, their motor neurons break down which leads to paralysis and/or loss of speech. Sometimes, individuals with ALS also experience cognitive and/or behavioral changes. In more severe cases, individuals have frontotemporal dementia (FTD). FTD is the most common form of dementia for individuals under 60 and involves a gradual, progressive decline in behavior, language or movement, with memory usually relatively preserved. The progression of ALS is approximately 3-5 years, but this can vary depending on age of onset and family history.     Genetics of ALS  Approximately 10-15% of ALS cases have a genetic cause which can be identified through genetic testing. There is a  greater suspicion of a genetic cause of ALS when there is a family history of the condition. Even with no family history of ALS, there is still a possibility that a new or de meche genetic change can be identified in an individual with ALS.    There are many different genetic changes that have been found to be associated with ALS. The most common cause is a repeat expansion of GGGGCC in the A8rgp28 gene. This change accounts for approximately 39-45% of familial cases of ALS. While there are no precise cutoffs, less than 25 repeats is considered normal and greater than 60 repeats is considered disease-causing. Several other genes are also known to cause ALS, including SOD1, FUS, TARDBP, ANG, OPTN, FIG4, VAPB, UBQLN2, SETX, NEK1, VCP, ALS2, TDP43, CHCHD10, DCTN1, MATR3, PFN1, TUBA4A, UNC13A, and ATXN2.     Genetic forms of ALS are typically inherited in an autosomal dominant pattern. This means that only one copy of an affected gene is sufficient to increase a person s risk for developing ALS. An individual with a change in a gene associated with ALS would therefore have a 50% chance of passing down the change to their children. All individuals with a change in a gene associated with ALS do not develop ALS and this is referred to as reduced penetrance. If the gene change is not passed down to a child, they are not at an increased risk of developing features of ALS.      Genetic testing options  For Vickey s disease, genetic testing involving repeat analysis of the AR gene is performed. Since there are many different genes that are associated with ALS, we discussed a genetic testing panel for the genes associated with ALS called the [facility] ALS panel with [id number]. We discussed that this panel also has a sponsored testing option available. Sponsored genetic testing panels are free of cost, but the genetic test results will be shared with the company sponsoring the testing. The patient s PHI or identifying information  will not be shared with the company. A positive result on either test could help provide a definitive diagnosis for Murtaza. A negative result for Vickey s disease could help rule out the disease which would inform medical management. A negative ALS result does not rule out the diagnosis.   We discussed the possible results of genetic testing:    Positive: A genetic change was identified that is associated with disease. This would provide a diagnosis for Murtaza and provide risk information to other family members.    Negative: No changes were found in any of the genes tested. While this result rules out a diagnosis of Vickey s disease, it does not rile out the diagnosis of ALS. A negative result does not necessarily mean that other family members do not have a chance of developing ALS, but only rules out the genetic causes tested on this panel.    Variant of uncertain significance (VUS): A genetic change was found but it is unclear whether this gene is disease causing. An uncertain result cannot provide a diagnosis.  Benefits, limitations, and risks of genetic testing were discussed in detail. Murtaza expressed excellent understanding of this information and provided consent for both ALS and Vickey s disease genetic testing. After a discussion of different payment options, Murtaza decided to pursue Vickey s disease genetic testing through [facility] at an out-of-pocket price of $250. He also decided to pursue the [facility] panel for ALS genetic testing.    Familial Genetic testing:  Of note, Murtaza's brother was tested via the [facility] panel [id number] and found to have a BRCA1 mutation c.2475del p.Akb471KmiSw*21 aka the 2594delC mutation and a TP53 VUS c.328C>T p.Dvd630Tby on 2/25/20. This information was confirmed via his genetic test report that Murtaza brought to this appointment. Murtaza declined a referral to cancer genetic counseling at this time. I encouraged him to inform his children of their  risk.    Plan:    Blood samples were obtained today and sent for genetic testing. Vickey s disease testing will be done through [facility] and ALS genetic testing will be done through [facility].    Return pending results in 4-6 weeks.     Contact information was provided should any questions arise.    Allyson nance GC Intern    Time spent with the patient 50 minutes.    Patient seen, evaluated and discussed with the Genetic Counseling Intern. I have verified the content of the note, which accurately reflects my assessment of the patient and the plan of care.  Supervising Genetic Counselor  Inés Brambila MS, [facility]  Licensed Genetic Counselor   [facility]  Department of Genetics and Neurology  Office: [phone]  Pager [phone]

## 2021-08-22 ENCOUNTER — HEALTH MAINTENANCE LETTER (OUTPATIENT)
Age: 71
End: 2021-08-22

## 2021-08-24 ENCOUNTER — DOCUMENTATION ONLY (OUTPATIENT)
Dept: NEUROLOGY | Facility: CLINIC | Age: 71
End: 2021-08-24

## 2021-08-24 DIAGNOSIS — G12.21 ALS (AMYOTROPHIC LATERAL SCLEROSIS) (H): Primary | ICD-10-CM

## 2021-08-24 ASSESSMENT — ENCOUNTER SYMPTOMS
SEIZURES: 0
NECK MASS: 0
LEG PAIN: 1
ARTHRALGIAS: 0
SPEECH CHANGE: 1
STIFFNESS: 1
HEMOPTYSIS: 0
TROUBLE SWALLOWING: 1
MUSCLE CRAMPS: 1
TASTE DISTURBANCE: 0
SINUS CONGESTION: 0
TREMORS: 0
HYPERTENSION: 1
SMELL DISTURBANCE: 0
POSTURAL DYSPNEA: 1
SORE THROAT: 0
EXERCISE INTOLERANCE: 0
SINUS PAIN: 0
SLEEP DISTURBANCES DUE TO BREATHING: 0
NECK PAIN: 0
HYPOTENSION: 0
NAUSEA: 0
PARALYSIS: 0
JOINT SWELLING: 0
BLOATING: 0
SHORTNESS OF BREATH: 1
ABDOMINAL PAIN: 0
NUMBNESS: 0
DIZZINESS: 0
CONSTIPATION: 1
LOSS OF CONSCIOUSNESS: 0
COUGH: 1
HEARTBURN: 0
RECTAL PAIN: 0
WHEEZING: 0
HOARSE VOICE: 1
SNORES LOUDLY: 1
MUSCLE WEAKNESS: 1
BLOOD IN STOOL: 0
COUGH DISTURBING SLEEP: 0
DIARRHEA: 0
DYSPNEA ON EXERTION: 0
WEAKNESS: 1
SYNCOPE: 0
MYALGIAS: 1
HEADACHES: 1
TINGLING: 1
PALPITATIONS: 0
BOWEL INCONTINENCE: 0
DISTURBANCES IN COORDINATION: 0
MEMORY LOSS: 0
LIGHT-HEADEDNESS: 0
BACK PAIN: 1
VOMITING: 0
JAUNDICE: 0
SPUTUM PRODUCTION: 1
ORTHOPNEA: 1

## 2021-08-24 NOTE — PROGRESS NOTES
Cervical MRI reviewed. It demonstrates multilevel canal stenosis without cord compression or gliosis. There is severe foraminal narrowing at C6 and C7 root levels, less severe at C5, none at C8.

## 2021-08-26 ENCOUNTER — THERAPY VISIT (OUTPATIENT)
Dept: OCCUPATIONAL THERAPY | Facility: CLINIC | Age: 71
End: 2021-08-26
Payer: MEDICARE

## 2021-08-26 ENCOUNTER — OFFICE VISIT (OUTPATIENT)
Dept: NEUROLOGY | Facility: CLINIC | Age: 71
End: 2021-08-26
Payer: MEDICARE

## 2021-08-26 ENCOUNTER — THERAPY VISIT (OUTPATIENT)
Dept: SPEECH THERAPY | Facility: CLINIC | Age: 71
End: 2021-08-26
Payer: MEDICARE

## 2021-08-26 VITALS
OXYGEN SATURATION: 95 % | WEIGHT: 214.5 LBS | DIASTOLIC BLOOD PRESSURE: 90 MMHG | RESPIRATION RATE: 16 BRPM | SYSTOLIC BLOOD PRESSURE: 138 MMHG | HEART RATE: 80 BPM

## 2021-08-26 DIAGNOSIS — Z78.9 IMPAIRED INSTRUMENTAL ACTIVITIES OF DAILY LIVING (IADL): ICD-10-CM

## 2021-08-26 DIAGNOSIS — R47.1 DYSARTHRIA: Primary | ICD-10-CM

## 2021-08-26 DIAGNOSIS — Z74.09 IMPAIRED MOBILITY AND ADLS: ICD-10-CM

## 2021-08-26 DIAGNOSIS — R13.12 OROPHARYNGEAL DYSPHAGIA: ICD-10-CM

## 2021-08-26 DIAGNOSIS — G12.21 ALS (AMYOTROPHIC LATERAL SCLEROSIS) (H): Primary | ICD-10-CM

## 2021-08-26 DIAGNOSIS — Z78.9 IMPAIRED MOBILITY AND ADLS: ICD-10-CM

## 2021-08-26 PROCEDURE — 99214 OFFICE O/P EST MOD 30 MIN: CPT | Performed by: PSYCHIATRY & NEUROLOGY

## 2021-08-26 PROCEDURE — 97165 OT EVAL LOW COMPLEX 30 MIN: CPT | Mod: GO | Performed by: OCCUPATIONAL THERAPIST

## 2021-08-26 PROCEDURE — 97535 SELF CARE MNGMENT TRAINING: CPT | Mod: GO | Performed by: OCCUPATIONAL THERAPIST

## 2021-08-26 PROCEDURE — 92610 EVALUATE SWALLOWING FUNCTION: CPT | Mod: GN | Performed by: SPEECH-LANGUAGE PATHOLOGIST

## 2021-08-26 PROCEDURE — 92522 EVALUATE SPEECH PRODUCTION: CPT | Mod: GN | Performed by: SPEECH-LANGUAGE PATHOLOGIST

## 2021-08-26 ASSESSMENT — REVISED AMYOTROPHIC LATERAL SCLEROSIS FUNCTIONAL RATING SCALE (ALSFRS-R)
SPEECH: INTELLIGIBLE WITH REPEATING
RESPIRATORY_SUBTOTAL_SCORE: 7
DYSPNEA: 2
HANDWRITING: NORMAL
HANDWRITING: 4
SWALLOWING: 3
SIX_ITEM_SUBTOTAL: 20
RESPIRATORY_INSUFFICIENCY: 3
TURNING_IN_BED_AND_ADJUSTING_BED_CLOTHES: 4
DYSPNEA: OCCURS WITH ONE OR MORE OF THE FOLLOWING: EATING, BATHING, DRESSING (ADL)
ORTHOPENA: 2
ORTHOPENA: NEEDS EXTRA PILLOWS IN ORDER TO SLEEP (MORE THAN TWO)
WALKING: 4
CLIMBING_STAIRS: SLOW
CUTTING_FOOD_AND_HANDLING_UTENSILES: 4
BULBAR_SUBTOTAL: 7
SPEECH: 2
DRESSING_AND_HYGEINE: NORMAL FUNCTION
FINE_MOTOR_SUBTOTAL_SCORE: 12
GROSS_MOTOR_SUBTOTAL_SCORE: 11
ALSFRS_TOTAL_SCORE: 37
DRESSING_AND_HYGEINE: 4
TURNING_IN_BED_AND_ADJUSTING_BED_CLOTHES: NORMAL
SALIVATION: MODERATELY EXCESSIVE SALIVA; MAY HAVE MINIMAL DROOLING
SALIVATION: 2
SWALLOWING: EARLY EATING PROBLEMS - OCCASIONAL CHOKING
WALKING: NORMAL
CLIMBING_STAIRS: 3
RESPIRATORY_INSUFFICIENCY: INTERMITTENT USE OF NIPPV

## 2021-08-26 ASSESSMENT — PAIN SCALES - GENERAL: PAINLEVEL: NO PAIN (0)

## 2021-08-26 NOTE — PROGRESS NOTES
Service Date: 2021    Tala King MD  [facility]  [address] [place]  [address]    Braxton Pelaez MD  [facility]  [address] [place]  [address]    RE:     Murtaza Jean  MRN:  [id number]  :  1950    History of Illness:   At the beginning , he noted a change in his speech as well as sporadic difficulty swallowing.  Both have progressed.  He also developed some difficulty breathing and was ultimately diagnosed as having atrial fibrillation.  In 2021, he was referred for an ENT evaluation, which identified neurological findings and was ultimately referred to Dr. King for further neurologic evaluation.  An electrodiagnostic study demonstrated evidence of widespread fibrillation potentials, fasciculation potentials and motor unit remodeling and is documented in our electronic health record.  A brain MRI demonstrated no diagnostic findings.  Cervical MRI demonstrates multilevel canal stenosis without cord compression or gliosis. There is severe foraminal narrowing at C6 and C7 root levels, less severe at C5, none at C8.    Interval symptoms:   Genetic testing is pending.     Cough: Weak cough, using cough assist regularly   Breathing:   Sleep: worse. difficulty sleeping, morning headaches and nonrestorative sleep  Dysarthria:   Dysphagia: liquids and solids  Sialorrhea: excess  Weight: modest  Mobility: denies falls  Spasticity: No spasticity  Pain/Cramps: cramping with voluntary muscle contraction   ADLs:  largely independent on others for ADLs.  Depression:   Anxiety:    Pseudobulbar: None   Cognitive:    Caregiver:     FAMILY HISTORY:  The patient has 1 healthy son.  He has a brother who passed away of cancer.  He has 2 other siblings, 1 male and 1 female, without neurological problems.  His father developed dementia in his mid 80s.  His mother  of cancer at age 61.  His mother had 8 siblings and his father 10.  There is no family history of  neuromuscular or neurodegenerative disease.  His maternal grandfather  in his 40s, cause unknown.    The patient is a  .  He is a retired infantry member and a retired .    PHYSICAL EXAMINATION:  The patient is a healthy appearing gentleman.  Examination of skin and extremities is unremarkable.  Neck is supple.  There were no vitals taken for this visit.  Wt Readings from Last 4 Encounters:   21 98 kg (216 lb)     NEUROLOGIC EXAMINATION:    NM Exam:   Cranial       Atrophy Fasciculations   Upper face: Negative Negative   Lower face: Negative Negative   Tongue: Negative Negative      Facial weakness: moderately severe orbicularis oris weakness  Tongue movements: mildly slowed  Tongue weakness:moderately severe weakness, thin and fasciculates at rest  Jaw jerk: absent  Speech: moderately severe, principally flaccid dysarthria which is 90% comprehensible.  Pseudobulbar affect: absent  Neck Flexion/Extension: 5/5        Upper Extremities        Right Left   Atrophy:  FDI FDI   Fasciculations:       Muscle tone:  equivocally increased   normal   Rapid alt. movements:  normal normal   Reflexes Right Left   Biceps: 3+  3+   Brachioradialis:  2+  3+   Triceps:  2+  2+   Oquendo:  absent absent   Strength Right Left   * Indicates a recommended field       Shoulder abduction*: 5 5   Elbow flexion: 5 5   Elbow extension*: 5 5   Wrist extension*:  5  5   Finger extension: 5 4+   Finger flexion:  5  5   Abductor pollicis brevis:  5  5   First dorsal interosseous*: 4A 5A      NM Exam: Lower Extremities       Right Left   Atrophy:  none  none   Fasciculations:       Muscle tone:  normal normal   Rapid alt. movements:       Reflexes Right Left   Patellar:  2+  2+   Achilles:  2+  2+   Plantar:  downgiong  equivocal    Strength Right Left   * Indicates a recommended field       Hip flexion*: 5 5   Hip extension:       Hip adduction:       Hip abduction:       Knee extension*: 5  5   Knee  flexion: 5 5   Ankle dorsiflexion*:  5 5      Sensory:    Vibration 7 at the right great toe, 0 at the left great toe, and 7 at the left malleolus.    Additional studies:   Labs: 8/4/21: AST 25 ALT 44, Alk Phos 67  PFTs: 8/4/21: FVC 64% MIP -33  MEP 50    Impression      I concur with the diagnosis of ALS. There is a paucity of upper motor neuron signs and this mandates a careful look for features of motor neuropathy or polyradiculopathy.  That said the prominent onset with bulbar findings exclude the vast majority of conditions in this category.  An infiltrative process including the subarachnoid space can cause widespread lower motor neuron findings including the cranial region; however, the preservation of reflexes, absence of sensory findings, absence of cranial nerve findings, and absence of features of a systemic infiltrative process make this exceedingly unlikely.  We will extend his laboratory studies modestly.     Genetic testing for SBMA still pending,although low suspicion of disease process at this time.     We then discussed the natural history, prognosis, and management of his condition at length.  He should continue on riluzole and Radicava. Given ventilatory function, recommend continue with BiPAP.      We did not discuss his sialorrhea in detail, but he may benefit from modest use of an anticholinergic, watching closely for mucus plugging.       We discussed the potential for gastrostomy and we discussed management of motor neuron disease at the end of life.  He is considering personal decisions that would be included in an Advance Directive.  Finally, we discussed the fact that this is a [facility]-supported diagnosis.  He will move forward with that process as well, which obviously could be modified in the highly unlikely possibility of the aforementioned investigations identifying an alternative diagnosis.    Plan  - Speech/  - cough assist  - Continue Riluzole  - starting Radicava  - PFTs at next  visit  - continue BiPAP

## 2021-08-26 NOTE — PROGRESS NOTES
OUTPATIENT OCCUPATIONAL THERAPY CLINIC NOTE  Murtaza Jean  YOB: 1950  [id number]    Type of visit:  Evaluation            Date of service: 8/26/2021    Referring provider: Dr. Seven Horn     present: No  Language: english    Others present at visit:  Spouse / significant other, Britt    Medical diagnosis:   Amyotrophic lateral sclerosis (ALS)     Date of diagnosis: 8/4/2021     Pertinent medical history:  A-fib    Additional Occupational Profile Information (patterns of daily living, interests, values and needs): Pt is a 72 yo man who is  with bulbar onset ALS having noticed first symptoms of change in speech and sporadic difficulty swallowing in Jan 2021.    Cardio-respiratory status:  Forced vital capacity: 64 % of predicted , MIP-33  BiPAP    Height/Weight: NT / 214#    Living environment:  House-1 level with basement, Lives in [place] so no yardwork  Walk-in shower  Higher height toilet    Living environment barriers:  0 stairs to enter   Railing on basement steps-full flight     Current assistance/living environment:  Lives with spouse      Current mobility equipment:  None    Current ADL equipment:  None     Technology used: smartphone and computer    Patient concerns/goals: notes legs get achy, fatigue varies and affects speech from day to day, 1 LOB with work out with  resulting in fall at gym    Evaluation   Interview completed.   Pain assessment:  Pain denied, achiness in muscles.    Range of motion:  UE AROM is full    Manual muscle testing: UE: 5/5 shoulders to wrists strong , lateral and palmar pinch    Cognition:  No noted changes    ADL:   Feeding self:  Ind  Grooming: Ind  UE Dressing:  Ind  LE Dressing:  Ind  Showering/bathing: Ind  Toileting/transfer:  Ind  Functional Mobility: Ind    IADL:   Home management:  Wife cooks and pt does dishes  Driving:  Ind  Occupation: computer, [employer], retired, , [employer]  Leisure: work  out- with wife at gym  Emergency Call system:phone      Fall Risk Screen:   Has the patient fallen 2 or more times in the last year? No      Has the patient fallen and had an injury in the past year? No, recent fall with attempt to use balance ball with  at gym, lost balance       Timed Up and Go Score: NT    Is the patient a fall risk? No     Impairments:  Fatigue  Muscle atrophy  Balance  Respiratory compromise     Treatment diagnosis:  Impaired activities of daily living  Impaired IADL and impaired mobility    Assessment of Occupational Performance: 1-3 Performance Deficits  Identified Performance Deficits (ie: feeding, social skills): showering, functional mobility-stairs, leisure  Clinical Decision Making (Complexity): Low complexity     Recommendations/Plan of care:  Patient would benefit from interventions to enhance safety and independence.  Rehab potential good for stated goals.  Occupational therapy intervention for  self care/home management.  1 session evaluation & treatment.    Goals:   Target date: today  Patient, family and/or caregiver will verbalize energy management techniques appropriate for status and setting.      Educational assessment/barriers to learning:  No barriers noted      Treatment provided this date:   Self care/home management, 10 minutes of training in:  -pacing and energy management with ALS related to self care and monitoring self with appropriate exer guidelines and signs of fatigue to watch for related to MND  -methods to modify shower environment to reduce dyspnea     Response to treatment/recommendations: receptive    Goal attainment:  All goals met    Risks and benefits of evaluation/treatment have been explained.  Patient, family and/or caregiver are in agreement with Plan of Care.     Timed Code Treatment Minutes: 10  Total Treatment Time (sum of timed and untimed services): 30  ALS FRS-R (ALS Functional Rating Scale-Revised) completed today. Please  see separate note.  Signature: CINTHIA Suero/LOIDA   Date: 8/26/2021      Certification:  Onset date: 8/26/21  Start of care date: 8/26/2021  Certification date from 8/26/2021 to 8/26/21    I CERTIFY THE NEED FOR THESE SERVICES FURNISHED UNDER        THIS PLAN OF TREATMENT AND WHILE UNDER MY CARE     (Physician attestation of this document indicates review and certification of the therapy plan).

## 2021-08-26 NOTE — PATIENT INSTRUCTIONS
"1. You will need hepatic panel blood tests monthly for 3 months, then every 3 months x3.  2. [facility]ak to the [facility] regarding the start of Radicava.  3. Ask your respiratory therapist about other mask options, such as \"nasal pillows.\"  4. Your [facility] providers can reach any [facility] provider through the provider line [phone]    Please call [facility] @ [phone] for questions or concerns during regular business hours. For a more efficient way to communicate, use 24Page[employer] and address the message to your physician. Remember, [employer] is only read during business hours. Do not leave urgent messages on voicemail or 24PageBooks. If situation is urgent, contact the Neurology Clinic @ [phone] and ask to speak to a Triage Nurse or Call 911 or visit an Emergency Department.     Please call your pharmacy if you need a medication refill. They will send us an electronic message.      "

## 2021-08-26 NOTE — LETTER
2021       RE: Murtaza Jean  [address]  [place] [address]     Dear Colleague,    Thank you for referring your patient, Murtaza Jean, to the [facility] [place] [facility]. Please see a copy of my visit note below.    Service Date: 2021    Anahi Hobbs, [facility] in [place]    Braxton Pelaez MD    RE:  Murtaza Jean  MRN:  [id number]  :  1950    Dear Ms. Hobbs and Dr. Pelaez:    I saw Murtaza Jean in followup at the [facility], where he met with me as well as other members of our multidisciplinary clinic team.  Mr. Jean's examination has changed little since he was seen recently.  Again, he has a largely flaccid dysarthria with tongue fasciculations and moderate tongue weakness.  Extremity strength is well preserved, although he does have some difficulty walking on heels.    His cervical MRI demonstrates no alternative explanation for brisk extremity reflexes.  His genetic testing is pending.    Mr. Jean has been on riluzole for 2 weeks now and should have a hepatic panel drawn monthly for 3 months and then quarterly for up to a year.  He would like to start Radicava, and we think it best that the Radicava order be written through the [facility] to assure coverage.  He is also beginning to use noninvasive ventilation but is having difficulty tolerating the mask; that said, he falls asleep more readily with NIV than without. Hence I recommended he contact his RT to trial different delivery systems, such as nasal pillows, and continue to use NIV.    Mr. Jean will co-manage at the [facility] and at our clinic, where he will be seen in followup in 6 months.    Sincerely,    Seven Horn MD    30 minutes spent on the date of the encounter on chart review, history and examination, documentation and further activities as noted  above.

## 2021-08-26 NOTE — PROGRESS NOTES
Service Date: 2021    Anahi Hobbs CNP  [facility] [place]    Braxton Pelaez MD    RE:  Murtaza Jean  MRN:  [id number]  :  1950    Dear Ms. Hobbs and Dr. Pelaez:    I saw Murtaza Jean in followup at the [facility], where he met with me as well as other members of our multidisciplinary clinic team.  Mr. Jean's examination has changed little since he was seen recently.  Again, he has a largely flaccid dysarthria with tongue fasciculations and moderate tongue weakness.  Extremity strength is well preserved, although he does have some difficulty walking on heels.    His cervical MRI demonstrates no alternative explanation for brisk extremity reflexes.  His genetic testing is pending.    Mr. Jean has been on riluzole for 2 weeks now and should have a hepatic panel drawn monthly for 3 months and then quarterly for up to a year.  He would like to start Radicava, and we think it best that the Radicava order be written through the [facility] to assure coverage.  He is also beginning to use noninvasive ventilation but is having difficulty tolerating the mask; that said, he falls asleep more readily with NIV than without. Hence I recommended he contact his RT to trial different delivery systems, such as nasal pillows, and continue to use NIV.    Mr. Jean will co-manage at the [facility] and at our clinic, where he will be seen in followup in 6 months.    Sincerely,    Seven Horn MD    30 minutes spent on the date of the encounter on chart review, history and examination, documentation and further activities as noted above.    D: 2021   T: 2021   MT: VANESSA    Name:     MURTAZA JEAN  MRN:      [id number]        Account:      [id number]   :      1950           Service Date: 2021       Document: [id number]

## 2021-08-27 LAB — SCANNED LAB RESULT: NORMAL

## 2021-08-27 NOTE — PROGRESS NOTES
Outpatient Speech Language Pathology Neurology Clinic Evaluation  Murtaza Jean YOB: 1950 [id number]    Visit Date: 8/26/2021    Age: 71 year old    Medical Diagnosis: Amyotrophic lateral sclerosis (ALS)    Date of Diagnosis: 8/4/2021    Referring MD: Seven Horn MD      present: No  Language: Aracelisligh     PMH: Refer to Medical Chart    Fall Risk:Refer to physical therapy report.    Others at Clinic Visit:  Spouse    Living Situation:   Independent    Patient Concerns/Goals:  Increased swallowing difficulty  Increased speech deficits  Augmentative / Alternative communication needs  Pt reports speech and swallowing deficits as relatively stable at this time.  First symptoms were change in speech and intermittent difficulty swallow with onset of January 2021.        Observations: Pt is highly pleasant and cooperative.  Pt is a  and wife reported that they have just are establishing care within the [facility].      Current Mode of Nutrition:   Oral diet    Weight: 214 lbs. 8 oz    Cardio-Respiratory Status: BiPAP       Forced vital capacity: 64 % of predicted    Oral Motor/Swallowing  Oral Motor Function:  Lingual weakness, reduced coordination    Volitional Abilities:  Cough- Weak  Throat Clear- Not functional  Swallow- Present    Feeding Assistance: No assistance needed    Swallow Function:   Thin Liquid-  WFL upon single sips.  No overt s/s of aspiration.   Puree-  WFL for tbsp bites.  No overt s/s of aspiration.   Solid-  WFL.  No overt s/s of aspiration.  No c/o globus sensation.     Pt reports that he continues to eat a variety of foods and that this swallowing is going well overall.  Pt reports that he tucks his chin with liquids for ease of swallow.  Pt denies overt coughing or throat clearing on liquid consistencies.  Pt continues to eat a variety of foods but notes that he does cut the food up into smaller pieces and takes reduced amounts.  Pt reports that he is not avoidant  of any items at this time.  Pt trained on using aspiration precautions and compensatory swallow strategies to facilitate ease and safety of swallow response.  Pt educated on expectations related to dysphagia as neurological disease progresses and introduced idea of PEG as an option of source for nutrition/hydration/medication as swallowing deteriorates.  Pt and SO reported understanding and agreement.            Dysphagia Diagnosis: Mild dysphagia    Speech Intelligibility/Functional Communication   Methods of communication: Verbal    Dysarthria: Mild-moderate    Speech:   Deficits in speech respiration- Shallow  Deficits in phonation- Breathy quality  Deficits in articulation- Decreased precision of articulation  Deficits in resonance- None   Intelligibility- 85% to a trained listener in a quiet, distraction free environement.  Wife rates pt's intelligibility at 90%.  She notes reduced intelligibility when they are not communicating face to face or if he is attempting to talk to her from another room.       Speech Intelligibility/Communication:  Communicates functionally, Impacts daily activities, Impacts social activities and Impacts ability to communicate basic wants/needs    Augmentative and Alternative Communication (AAC):   Does not have an AAC device and pt trained on the benefit of an AAC device to augment communication.  Demonstrated speech assistant terry on iPhone with pt's wife reporitng pt as 'techy.'  Encouraged pt to consider speech assistant terry and download as able.  Additional education provided on use of iPad for communication and communication expectations as dysarthria progresses.      Clinical Impression/Plan of Care  Treatment Diagnosis: Oropharyngeal Dysphagia and Dysarthia     Recommendations:  Oral diet.  Continue use of compensatory strategies.  Continue compensatory speech strategies.  Initiate use of AAC device.    Plan of Care:  Evaluation only.  Treatment provided as part of evaluations  completed this date.      Goals: Based on today's evaluation session patient and/or caregiver will have understanding of current communication and/or swallowing status and recommendations for management.    Educational Assessment:  Learners- Patient and Significant other  Barriers to Learning- No barriers.    Education provided/response:   Speech  Swallowing   As noted above.      Response to recommendations/treatment: Pt and wife reported understanding and agreement.      Goal attainment: All goals met    Risks and benefits of evaluation/treatment have been explained.  Patient, family and/or caregiver are in agreement with Plan of Care.    Total Time (sum of timed and untimed services): 30 minutes

## 2021-08-31 ENCOUNTER — TELEPHONE (OUTPATIENT)
Dept: NEUROLOGY | Facility: CLINIC | Age: 71
End: 2021-08-31

## 2021-08-31 NOTE — TELEPHONE ENCOUNTER
Contacted Murtaza and explained that the results of his Vickey disease testing were negative or normal. We are still waiting on the results of his ALS genetic testing.    Inés Brambila MS [facility]  Genetic Counselor  Division of Genetics and Metabolism  (p) [phone]  (f) [phone]

## 2021-09-01 LAB — SCANNED LAB RESULT: NORMAL

## 2021-09-02 ENCOUNTER — TELEPHONE (OUTPATIENT)
Dept: NEUROLOGY | Facility: CLINIC | Age: 71
End: 2021-09-02

## 2021-09-10 ENCOUNTER — VIRTUAL VISIT (OUTPATIENT)
Dept: NEUROLOGY | Facility: CLINIC | Age: 71
End: 2021-09-10
Payer: MEDICARE

## 2021-09-10 DIAGNOSIS — G12.21 ALS (AMYOTROPHIC LATERAL SCLEROSIS) (H): Primary | ICD-10-CM

## 2021-09-10 PROCEDURE — 99207 PR NO BILLABLE SERVICE THIS VISIT: CPT | Mod: TEL | Performed by: GENETIC COUNSELOR, MS

## 2021-09-10 PROCEDURE — 99207 PR NO CHARGE LOS: CPT | Performed by: GENETIC COUNSELOR, MS

## 2021-09-10 NOTE — PROGRESS NOTES
Murtaza is a 71 year old who is being evaluated via a billable telephone visit.      What phone number would you like to be contacted at? [phone]  How would you like to obtain your AVS? Art  Phone call duration:  minutes

## 2021-09-22 ENCOUNTER — TELEPHONE (OUTPATIENT)
Dept: CONSULT | Facility: CLINIC | Age: 71
End: 2021-09-22

## 2021-09-22 NOTE — PROGRESS NOTES
Presenting information: Murtaza is a 71 year old male with a history of ALS. Murtaza was accompanied by his wife Britt. I met with the family at the request of Dr. Horn to discuss the results of Murtaza's genetic testing.     Personal History: Murtaza noticed a change in his speech as well as sporadic difficulty swallowing earlier this year. Both have progressed. He also developed some difficulty breathing and was ultimately diagnosed as having atrial fibrillation. He has had some cramping with voluntary muscle contraction and prominent fasciculations in the upper limbs. He has noted some muscle weakness, which is not interfering with daily activity.  See Dr. Horn s note for additional details.      Family History: A three generation pedigree was obtained today and scanned into the EMR. This family history is by patient report only and has not been verified with medical records except where noted. The following information is significant:     Murtaza has a son (age 33) who is healthy.    Murtaza had two brothers and one sister. His brother  at age 68 from prostate cancer and had positive genetic testing for BRCA. He had one son and two daughters who are healthy. Murtaza s brother (age 64) is healthy and has two daughters and one son who are healthy. Murtaza s sister (age 61) is healthy and has one son and one daughter who are healthy.    Murtaza dugan mother  at 61 from ovarian cancer. She had eight siblings who had no neurological symptoms. Murtaza s maternal grandfather  in his 40s of unknown causes.    Murtaza dugan father  at 91. He had dementia in his 80s and stents in his heart. Murtaza dugan father had ten siblings who did not have neurological symptoms.     The family history is otherwise negative for dementia, Parkinson s disease, movement disorders, ALS, muscle weakness, and other neurological symptoms. Consanguinity was denied. Paternal ancestry is [place]. Maternal ancestry is [place] and [place].  See scanned pedigree under the  media tab.    Discussion: Dear Murtaza    Thank you for allowing me to be a part of your healthcare at the [facility].  At your visit on 9/10/21 your genetic test results were discussed. As we discussed, your genetic test results did not find any disease causing or pathogenic variants. 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 This letter is a brief summary of our discussion and these genetic test results. I have also included a copy of the lab report for your records.     Our genes are sequences of letters that provide instructions that help our body grow, develop and function. We all have changes or variations in our genes that make us unique. Some variations cause the body to be unable to read the instructions. These variations are called pathogenic and can result in a genetic condition. Your genetic testing looked at many of your genes to determine if any variants or changes were present.     As we discussed by phone, this test found one variant of uncertain significance. This variant is technically called KIF5A [id number]. Variants of uncertain significance are changes in our genes that may or may not cause disease. Currently we have limited information regarding whether or not these variants will impact your health. Disease causing variants in this gene are associated with multiple genetic conditions. Your genetic testing does not confirm a diagnosis of these conditions. The following information is provided for informational purposes only.    Inheritance of Genetic Conditions  We reviewed that VRI0P-glaugmr disorders are inherited in an autosomal dominant manner.An autosomal dominant pattern of inheritance means that a person only needs one nonworking gene copy to show signs or symptoms of a condition and that both males and females can have the condition. If a parent has an autosomal dominant condition, there is a 50% chance  with each pregnancy that they will have a child who also has this condition and a 50% chance that they will have a child who does not have this condition.     Clinical Presentation of KIF5A  Disease causing changes in KIF5A gene are associated with 1 of 3 genetic conditions.  These conditions are called  myoclonus, hereditary spastic paraplegia type 10 (HSP10), and amyotrophic lateral sclerosis (ALS).      myoclonus is a condition that causes a specific type of seizure called an intractable myoclonic seizure that typically starts soon after birth.  Babies and infants who have this condition often have difficulty breathing (intermittent apnea), abnormal movements of the eyes, problems with the function of the optic nerve, and lack of progress through the typical developmental milestones.  The severity of this condition is variable.  However, some children do not survive past infancy with this condition.    Hereditary spastic paraplegias or HSPs refer to a group of neurological conditions that are characterized by weakness and spasticity or stiffness in the muscles of the legs. There are two broad categories of HSPs - uncomplicated and complicated. Individuals with uncomplicated HSPs typically only have symptoms such as weakness and/or spasticity in leg muscles. This can result in difficulty walking, mild loss of sensation in legs, balance problems, and an urgency for urination. These individuals usually do not experience any weakness in their arms and have no problems with speaking or swallowing. Complicated HSPs, on the other hand, usually have the same symptoms as uncomplicated HSPs with the addition of features like seizures, intellectual disability, dementia, balance problems or ataxia, difficulty speaking and/or swallowing, issues with eye movement, and foot deformities. While these are many of the possible symptoms someone with HSP may experience, an individual with HSP may only experience one  or some of these. For both uncomplicated and complicated types of HSP, symptoms can begin anywhere from early childhood to later in adulthood. Individuals can begin experiencing mild symptoms that progress or become more severe over time.     HSP10 can present as complicated or uncomplicated in different individuals.  Most individuals with HSP10 experience uncomplicated HSP and symptoms began in childhood or young adulthood.  Some individuals develop sensory symptoms in their extremities, especially their fingers and toes, in addition to symptoms of uncomplicated HSP.  Rarely, individuals with HSP10 may develop features of complicated HSP including parkinsonism or cognitive decline.    Amyotrophic lateral sclerosis (ALS) is a condition that affects the motor neurons.  Motor neurons are responsible for sending the necessary signals to the muscles, to allow for movement and speech. In ALS, these motor neurons break down and eventually lead to loss of speech and muscle control. This is a progressive disorder, meaning that the symptoms of this condition worsen over time. However, the rate of progression and muscles groups that are impacted the most is different for different individuals. For example, the first symptom that some people may experience is weakness in their throat muscles while others may experience weakness in an arm or a leg at the onset of symptoms.  Individuals with ALS due to disease causing changes in the KIF5A gene can begin to exhibit symptoms at any time in their life but may have a slower progression of the condition that is generally seen.  Individuals with a disease causing change in this gene that do not have ALS may or may not develop ALS during their lifetime. Having a disease causing change in this gene does not guarantee that an individual will develop ALS.     Having a VUS in KIF5A does not establish a diagnosis of any of the conditions described above. As we reviewed, the symptoms of   myoclonus do not match the health concerns that you have been experiencing.  The symptoms of hereditary spastic paraplegia type 10 do not appear to match your current clinical symptoms, however this should be discussed with your neurologist.  This variant of uncertain significance may or may not be the cause of your diagnosis of ALS. There may be additional information about your KIF5A VUS in the future that would help us understand what health implications it has for you and your relatives, if any.      Next Steps  At this time, we are unable to determine if the variant of uncertain significance identified is disease causing or not. In the future, we may learn more about this variant.  If this variant is reclassified to disease causing in the future this would have implications for your family members and they could be at risk for any of the conditions described above.  Genetic testing for this variant of uncertain significance is not recommended for family members who do not have symptoms of the conditions described above.  If any of your family members develop ALS in the future, testing them for this variant of uncertain significance may help further our understanding of this genetic change.  In the meantime, it is recommended that you continue to follow with the neurology clinic of your choice to receive up-to-date medical management recommendations regarding your clinical diagnosis of ALS.    Thank you again for allowing us to be a part of your care. Please do not hesitate to contact me with additional questions or concerns.    Sincerely,  Inés Brambila [facility] [place] [facility]  Genetic Counselor  Division of Genetics and Metabolism  (p) [phone]      Plan:  1.   Reviewed the results of caries ALS genetic testing.  2. Contact information was provided should any questions arise in the future.     Inés Brambila [facility]  Genetic Counselor  Division of Genetics and Metabolism  (p) [phone]  (f) [phone]      Total time spent in consultation with the family was approximately 22 minutes    Cc: No Letter

## 2021-09-22 NOTE — TELEPHONE ENCOUNTER
A copy of Murtaza's genetic test reports and results letter written by Inés Brambila (letters tab 9/21/21) was sent to Murtaza via secure email so he has this to share with [facility]. Copies were also put in the mail to Murtaza's home address.     Stacy Reyes MS, [facility]  Licensed Genetic Counselor  [facility] [place]  Phone: [phone]  Fax: [phone]

## 2021-10-17 ENCOUNTER — HEALTH MAINTENANCE LETTER (OUTPATIENT)
Age: 71
End: 2021-10-17

## 2022-01-01 ENCOUNTER — HEALTH MAINTENANCE LETTER (OUTPATIENT)
Age: 72
End: 2022-01-01